# Patient Record
Sex: MALE | Race: BLACK OR AFRICAN AMERICAN | NOT HISPANIC OR LATINO | Employment: OTHER | URBAN - METROPOLITAN AREA
[De-identification: names, ages, dates, MRNs, and addresses within clinical notes are randomized per-mention and may not be internally consistent; named-entity substitution may affect disease eponyms.]

---

## 2017-07-06 ENCOUNTER — TELEPHONE (OUTPATIENT)
Dept: HEMATOLOGY/ONCOLOGY | Facility: CLINIC | Age: 52
End: 2017-07-06

## 2017-07-28 ENCOUNTER — TELEPHONE (OUTPATIENT)
Dept: HEMATOLOGY/ONCOLOGY | Facility: CLINIC | Age: 52
End: 2017-07-28

## 2017-07-28 ENCOUNTER — INITIAL CONSULT (OUTPATIENT)
Dept: HEMATOLOGY/ONCOLOGY | Facility: CLINIC | Age: 52
End: 2017-07-28
Payer: MEDICARE

## 2017-07-28 ENCOUNTER — LAB VISIT (OUTPATIENT)
Dept: LAB | Facility: HOSPITAL | Age: 52
End: 2017-07-28
Attending: INTERNAL MEDICINE
Payer: MEDICARE

## 2017-07-28 VITALS
WEIGHT: 162.25 LBS | HEART RATE: 85 BPM | DIASTOLIC BLOOD PRESSURE: 79 MMHG | BODY MASS INDEX: 24.59 KG/M2 | SYSTOLIC BLOOD PRESSURE: 148 MMHG | TEMPERATURE: 99 F | HEIGHT: 68 IN

## 2017-07-28 DIAGNOSIS — C79.51 BONE METASTASES: ICD-10-CM

## 2017-07-28 DIAGNOSIS — C64.9 RENAL CELL CARCINOMA, UNSPECIFIED LATERALITY: ICD-10-CM

## 2017-07-28 DIAGNOSIS — G89.3 CANCER RELATED PAIN: Primary | ICD-10-CM

## 2017-07-28 DIAGNOSIS — G89.3 CANCER RELATED PAIN: ICD-10-CM

## 2017-07-28 LAB
ALBUMIN SERPL BCP-MCNC: 2 G/DL
ALP SERPL-CCNC: 118 U/L
ALT SERPL W/O P-5'-P-CCNC: 15 U/L
ANION GAP SERPL CALC-SCNC: 18 MMOL/L
AST SERPL-CCNC: 27 U/L
BASOPHILS # BLD AUTO: 0.04 K/UL
BASOPHILS NFR BLD: 0.4 %
BILIRUB SERPL-MCNC: 0.5 MG/DL
BUN SERPL-MCNC: 55 MG/DL
CALCIUM SERPL-MCNC: 9.5 MG/DL
CHLORIDE SERPL-SCNC: 100 MMOL/L
CO2 SERPL-SCNC: 25 MMOL/L
CREAT SERPL-MCNC: 9 MG/DL
DIFFERENTIAL METHOD: ABNORMAL
EOSINOPHIL # BLD AUTO: 0.1 K/UL
EOSINOPHIL NFR BLD: 1.2 %
ERYTHROCYTE [DISTWIDTH] IN BLOOD BY AUTOMATED COUNT: 19.2 %
EST. GFR  (AFRICAN AMERICAN): 7.1 ML/MIN/1.73 M^2
EST. GFR  (NON AFRICAN AMERICAN): 6.1 ML/MIN/1.73 M^2
GLUCOSE SERPL-MCNC: 85 MG/DL
HCT VFR BLD AUTO: 22.5 %
HGB BLD-MCNC: 6.8 G/DL
LYMPHOCYTES # BLD AUTO: 1.8 K/UL
LYMPHOCYTES NFR BLD: 17.1 %
MAGNESIUM SERPL-MCNC: 1.6 MG/DL
MCH RBC QN AUTO: 25.2 PG
MCHC RBC AUTO-ENTMCNC: 30.2 G/DL
MCV RBC AUTO: 83 FL
MONOCYTES # BLD AUTO: 1.1 K/UL
MONOCYTES NFR BLD: 9.8 %
NEUTROPHILS # BLD AUTO: 7.6 K/UL
NEUTROPHILS NFR BLD: 71.5 %
PHOSPHATE SERPL-MCNC: 4.6 MG/DL
PLATELET # BLD AUTO: 250 K/UL
PMV BLD AUTO: 8.3 FL
POTASSIUM SERPL-SCNC: 3.6 MMOL/L
PROT SERPL-MCNC: 7.1 G/DL
RBC # BLD AUTO: 2.7 M/UL
SODIUM SERPL-SCNC: 143 MMOL/L
WBC # BLD AUTO: 10.68 K/UL

## 2017-07-28 PROCEDURE — 99999 PR PBB SHADOW E&M-EST. PATIENT-LVL IV: CPT | Mod: PBBFAC,,, | Performed by: INTERNAL MEDICINE

## 2017-07-28 PROCEDURE — 84100 ASSAY OF PHOSPHORUS: CPT

## 2017-07-28 PROCEDURE — 80053 COMPREHEN METABOLIC PANEL: CPT

## 2017-07-28 PROCEDURE — 99205 OFFICE O/P NEW HI 60 MIN: CPT | Mod: S$PBB,,, | Performed by: INTERNAL MEDICINE

## 2017-07-28 PROCEDURE — 36415 COLL VENOUS BLD VENIPUNCTURE: CPT

## 2017-07-28 PROCEDURE — 83735 ASSAY OF MAGNESIUM: CPT

## 2017-07-28 PROCEDURE — 85025 COMPLETE CBC W/AUTO DIFF WBC: CPT

## 2017-07-28 RX ORDER — OXYCODONE AND ACETAMINOPHEN 10; 325 MG/1; MG/1
TABLET ORAL
Refills: 0 | COMMUNITY
Start: 2017-05-31 | End: 2017-07-28 | Stop reason: SDUPTHER

## 2017-07-28 RX ORDER — MORPHINE SULFATE 30 MG/1
60 TABLET, FILM COATED, EXTENDED RELEASE ORAL 3 TIMES DAILY
Qty: 180 TABLET | Refills: 0 | Status: SHIPPED | OUTPATIENT
Start: 2017-07-28 | End: 2017-08-03 | Stop reason: SDUPTHER

## 2017-07-28 RX ORDER — AMLODIPINE BESYLATE 10 MG/1
TABLET ORAL
COMMUNITY
Start: 2017-07-27

## 2017-07-28 RX ORDER — METOPROLOL SUCCINATE 100 MG/1
TABLET, EXTENDED RELEASE ORAL
COMMUNITY
Start: 2017-07-20

## 2017-07-28 RX ORDER — HYDRALAZINE HYDROCHLORIDE 100 MG/1
TABLET, FILM COATED ORAL
Refills: 11 | COMMUNITY
Start: 2017-05-31

## 2017-07-28 RX ORDER — CLOPIDOGREL BISULFATE 75 MG/1
TABLET ORAL
COMMUNITY
Start: 2017-07-27

## 2017-07-28 RX ORDER — OXYCODONE AND ACETAMINOPHEN 10; 325 MG/1; MG/1
1 TABLET ORAL EVERY 4 HOURS PRN
Qty: 120 TABLET | Refills: 0 | Status: SHIPPED | OUTPATIENT
Start: 2017-07-28

## 2017-07-28 RX ORDER — ALPRAZOLAM 1 MG/1
TABLET ORAL
Refills: 4 | COMMUNITY
Start: 2017-07-11

## 2017-07-28 RX ORDER — MORPHINE SULFATE 30 MG/1
30 TABLET, FILM COATED, EXTENDED RELEASE ORAL 3 TIMES DAILY
COMMUNITY
End: 2017-07-28 | Stop reason: SDUPTHER

## 2017-07-28 RX ORDER — GUANFACINE 2 MG/1
TABLET ORAL
COMMUNITY
Start: 2017-07-20

## 2017-07-28 RX ORDER — OMEPRAZOLE 40 MG/1
CAPSULE, DELAYED RELEASE ORAL
COMMUNITY
Start: 2017-07-20

## 2017-07-28 RX ORDER — ATORVASTATIN CALCIUM 10 MG/1
TABLET, FILM COATED ORAL
COMMUNITY
Start: 2017-07-10

## 2017-07-28 NOTE — Clinical Note
CBC, CMP, Mg, Phos today, CT CAP and MRI brain ASAP on [facility] Onc referral ASAP, RTC to see me 1-2 wks after above completed.

## 2017-07-28 NOTE — TELEPHONE ENCOUNTER
tried called pt in regards to scan but received no answer,  states this not a working number at this time, pt was called on number ending [phone] pt is not available., pt appointments will be mailed out on Monday.

## 2017-07-28 NOTE — LETTER
July 28, 2017      Romeo Haq MD  [address] [facility] [address]           [facility] Hematology [facility]  [address]  [place] [address]  Phone: [phone]          Patient: Garo Hussein Sr   MR Number: [id number]   YOB: 1965   Date of Visit: 7/28/2017       Dear Dr. Romeo Haq:    Thank you for referring Garo Hussein Sr to me for evaluation. Attached you will find relevant portions of my assessment and plan of care.    If you have questions, please do not hesitate to call me. I look forward to following Garo Hussein Sr along with you.    Sincerely,    Rasheed Weston MD    Enclosure  CC:  No Recipients    If you would like to receive this communication electronically, please contact [email or web address] or [phone] to request more information on [employer] Link access.    For providers and/or their staff who would like to refer a patient to [facility], please contact us through our one-stop-shop provider referral line, Warren Roque, at [phone].    If you feel you have received this communication in error or would no longer like to receive these types of communications, please e-mail [email or web address]

## 2017-07-28 NOTE — PROGRESS NOTES
Subjective:       Patient ID: Garo Hussein Sr is a 52 y.o. male.    [facility]    [facility]PI     53yo M who recently visit Dr. Avery Haq at [facility] for new dx of metastatic RCC.  I do not have all of his records, but we will obtain a MARJORIE and try to get them.   Pt notes that he has not been feeling well.  He has tremendous pain in the L UE corresponding to the site of a bone met.  His PS has been declining, and is currently 2-3.   Here to discuss options.    Review of Systems   Constitutional: Positive for activity change, appetite change, fatigue and unexpected weight change. Negative for chills and fever.   HENT: Negative for congestion, hearing loss, mouth sores, sore throat and trouble swallowing.    Eyes: Negative for pain and visual disturbance.   Respiratory: Negative for cough, shortness of breath and wheezing.    Cardiovascular: Negative for chest pain, palpitations and leg swelling.   Gastrointestinal: Negative for abdominal pain, constipation, diarrhea, nausea and vomiting.   Endocrine: Negative for cold intolerance and heat intolerance.   Genitourinary: Negative for difficulty urinating, discharge, dysuria, enuresis, frequency, hematuria, scrotal swelling and testicular pain.   Musculoskeletal: Negative for arthralgias and myalgias.   Skin: Negative for color change, rash and wound.   Allergic/Immunologic: Negative for environmental allergies and food allergies.   Neurological: Negative for weakness, numbness and headaches.   Hematological: Negative for adenopathy. Does not bruise/bleed easily.   Psychiatric/Behavioral: Negative for confusion, hallucinations and sleep disturbance. The patient is not nervous/anxious.    All other systems reviewed and are negative.      Allergies:  Review of patient's allergies indicates:   Allergen Reactions    Soma [carisoprodol] Itching    Clonidine        Medications:  Current Outpatient Prescriptions   Medication Sig Dispense Refill    alprazolam  (XANAX) 1 MG tablet TK 1 T PO TID PRN  4    amlodipine (NORVASC) 10 MG tablet       atorvastatin (LIPITOR) 10 MG tablet       clopidogrel (PLAVIX) 75 mg tablet       guanfacine (TENEX) 2 MG tablet       hydrALAZINE (APRESOLINE) 100 MG tablet TK 1 T PO  TID  11    metoprolol succinate (TOPROL-XL) 100 MG 24 hr tablet       morphine (MS CONTIN) 30 MG 12 hr tablet Take 2 tablets (60 mg total) by mouth 3 (three) times daily. 180 tablet 0    omeprazole (PRILOSEC) 40 MG capsule       oxycodone-acetaminophen (PERCOCET)  mg per tablet Take 1 tablet by mouth every 4 (four) hours as needed for Pain. 120 tablet 0     No current facility-administered medications for this visit.        PMH:  Past Medical History:   Diagnosis Date    Cancer     Disorder of kidney and ureter     Renal cancer        PSH:  Past Surgical History:   Procedure Laterality Date    bone cancer Left     shoulder       FamHx:  History reviewed. No pertinent family history.    SocHx:  Social History     Social History    Marital status:      Spouse name: N/A    Number of children: N/A    Years of education: N/A     Occupational History    Not on file.     Social History Main Topics    Smoking status: Never Smoker    Smokeless tobacco: Never Used    Alcohol use No    Drug use:      Frequency: 21.0 times per week     Types: Marijuana      Comment: Patient smoke 3 times a day    Sexual activity: No     Other Topics Concern    Not on file     Social History Narrative    No narrative on file       Distress Score    Distress Score: 9        Practical Problems Physical Problems   : No Appearance: No   Housing: No Bathing / Dressing: No   Insurance / Financial: No Breathing: No    Transportation: No  Changes in Urination: No    Work / School: No  Constipation: No   Treatment Decisions: Yes  Diarrhea: No     Eating: Yes    Family Problems Fatigue: Yes    Dealing with Children: No Feeling Swollen: No    Dealing with  Partner: No Fevers: No    Ability to Have Children: No  Getting Around: Yes    Family Health Issues: Yes  Indigestion: No     Memory / Concentration: Yes   Emotional Problems Mouth Sores: No    Depression: Yes  Nausea: No    Fears: Yes  Nose Dry / Congested: No    Nervousness: Yes  Pain: Yes    Sadness: Yes Sexual: No    Worry: Yes Skin Dry / Itchy: No    Lost of Interest in Usual Activities: Yes Sleep: No     Substance Abuse: Yes    Spiritual/Religions Concerns Tingling in Hands / Feet: No             Other Problems              Objective:      Physical Exam   Constitutional: He is oriented to person, place, and time. He appears well-nourished. No distress.   In wheelchair, appears chronically ill   HENT:   Head: Normocephalic and atraumatic.   Mouth/Throat: Oropharynx is clear and moist. No oropharyngeal exudate.   Eyes: Conjunctivae and EOM are normal. Pupils are equal, round, and reactive to light. Right eye exhibits no discharge. Left eye exhibits no discharge. No scleral icterus.   Neck: Normal range of motion. Neck supple. No tracheal deviation present.   Cardiovascular: Normal rate, regular rhythm, normal heart sounds and intact distal pulses.  Exam reveals no gallop and no friction rub.    No murmur heard.  Pulmonary/Chest: Effort normal and breath sounds normal. No respiratory distress. He has no wheezes. He has no rales.   Abdominal: Soft. Bowel sounds are normal. He exhibits no distension. There is no tenderness. There is no rebound and no guarding.   Musculoskeletal: Normal range of motion. He exhibits no edema or tenderness.   Neurological: He is alert and oriented to person, place, and time.   Skin: Skin is warm and dry. No rash noted. He is not diaphoretic. No erythema.   Psychiatric: He has a normal mood and affect. His behavior is normal. Judgment and thought content normal.   Nursing note and vitals reviewed.      Assessment:       1. Cancer related pain    2. Renal cell carcinoma, unspecified  laterality    3. Bone metastases        Plan:       1. Met RCC:  - Performance status is borderline at best  - Discussed starting home health, restaging scans including MRI brain, referral to rad onc for consideration of palliative radiation to L UE bone met (although this is over HD fistula).  It his PS improves we can consider dose reduced pazo vs nivo.    - Pt understands this is an incurable disease and that his life expectancy is likely limited in the setting of terminal cancer with such severe comorbidities.   - Refilled current pain regimen    CBC, CMP, Mg, Phos today, CT CAP and MRI brain ASAP on [facility], Rad Onc referral ASAP, RTC to see me 1-2 wks after above completed.    The patient agrees with the plan, and all questions have been answered to their satisfaction.      More than 60 mins were spent during this encounter, greater than 50% was spent in direct counseling and/or coordination of care.     Rasheed Weston M.D., M.S., F.A.C.P.  Hematology and Oncology Attending  [facility]  [facility]      Distress Screening Results: Psychosocial Distress screening score of Distress Score: 9 noted and reviewed. No intervention indicated.

## 2017-07-29 NOTE — TELEPHONE ENCOUNTER
----- Message from Qi Cook LCSW sent at 7/28/2017  7:23 PM CDT -----  Spoke with patient's wife. Referral to [facility].   ----- Message -----  From: Veronica Charlton RN  Sent: 7/28/2017  11:52 AM  To: Qi Cook LCSW        ----- Message -----  From: Rasheed Weston MD  Sent: 7/28/2017  11:50 AM  To: Veronica Charlton RN, KARINA Sims, Needs home health ASAP. Orders in.  Thanks!  -MM

## 2017-08-03 DIAGNOSIS — G89.3 CANCER RELATED PAIN: ICD-10-CM

## 2017-08-03 RX ORDER — MORPHINE SULFATE 30 MG/1
60 TABLET, FILM COATED, EXTENDED RELEASE ORAL 3 TIMES DAILY
Qty: 180 TABLET | Refills: 0 | Status: SHIPPED | OUTPATIENT
Start: 2017-08-03 | End: 2017-08-04

## 2017-08-03 NOTE — TELEPHONE ENCOUNTER
----- Message from Torie Knutson sent at 8/3/2017  8:14 AM CDT -----  Contact: Pt's wife  Pt's wife is calling to have medication refilled for morphine (MS CONTIN) 30 MG 12 hr tablet.  Pt is currently out of medication and more than 2 calls have been placed to get a refill sent to pharmacy.    Rx can be sent to [facility] at [phone].  Pt's wife can be reached at [phone].    Thank you

## 2017-08-04 ENCOUNTER — TELEPHONE (OUTPATIENT)
Dept: HEMATOLOGY/ONCOLOGY | Facility: CLINIC | Age: 52
End: 2017-08-04

## 2017-08-04 ENCOUNTER — HOSPITAL ENCOUNTER (OUTPATIENT)
Dept: RADIOLOGY | Facility: HOSPITAL | Age: 52
Discharge: HOME | End: 2017-08-04
Attending: INTERNAL MEDICINE
Payer: MEDICARE

## 2017-08-04 DIAGNOSIS — G89.3 CANCER RELATED PAIN: ICD-10-CM

## 2017-08-04 DIAGNOSIS — C79.51 BONE METASTASES: ICD-10-CM

## 2017-08-04 DIAGNOSIS — C64.9 RENAL CELL CARCINOMA, UNSPECIFIED LATERALITY: ICD-10-CM

## 2017-08-04 PROCEDURE — 71250 CT THORAX DX C-: CPT | Mod: TC

## 2017-08-04 PROCEDURE — 70551 MRI BRAIN STEM W/O DYE: CPT | Mod: 26,,, | Performed by: RADIOLOGY

## 2017-08-04 PROCEDURE — 71250 CT THORAX DX C-: CPT | Mod: 26,,, | Performed by: RADIOLOGY

## 2017-08-04 PROCEDURE — 74176 CT ABD & PELVIS W/O CONTRAST: CPT | Mod: TC

## 2017-08-04 PROCEDURE — 70551 MRI BRAIN STEM W/O DYE: CPT | Mod: TC

## 2017-08-04 PROCEDURE — 74176 CT ABD & PELVIS W/O CONTRAST: CPT | Mod: 26,,, | Performed by: RADIOLOGY

## 2017-08-04 PROCEDURE — 25500020 PHARM REV CODE 255

## 2017-08-04 RX ADMIN — IOHEXOL 30 ML: 350 INJECTION, SOLUTION INTRAVENOUS at 09:08

## 2017-08-04 NOTE — TELEPHONE ENCOUNTER
Called pt wife---wife unavailable.   Left  informing wife of new morphine rx called in (lower quantity) and if any issues have pharmacy call Dr. Weston.   Informed wife on VM that this morphine not to be split and will be 1 tab TID prn pain.   Provided callback number if any questions.

## 2017-08-04 NOTE — TELEPHONE ENCOUNTER
1. Called [employer] and was informed that reasoning that pharmacist didn't call provider was because medication was rejected so pharmacist never saw medication note.     2. Called [facility], and spoke with representative Cyndy, and asked reasoning why medication not able to be processed.   Cyndy stated that PA needed on medication.   PA completed for Morphine 60mg, 90 tablets.   PA approved, case number #[id number], effective 7/5/17-8/4/18.   Cyndy asked that they will notify provider and patient of approval, just to call pharmacy and notify them of approval.   Thanked Cyndy for her help.     3. Called [employer] and informed them of approval for Morphine 60mg. [facility] stated medication able to be filled now, and they will contact pt wife of medication fill.     Message routed to Dr. Weston ([facility]).           ----- Message from Rasheed Weston MD sent at 8/4/2017  2:47 PM CDT -----  Contact: pt   Can we call [employer] and 1) find out why the pharmacist didn't call me as instructed, and 2) what the insurance company will pay for.  Unfortunately, the wife may have to come to [facility] if she wants to get the medicine, [facility] just doesn't work for cancer patients most of the time.  THANK YOU!!   -MM    ----- Message -----  From: Dulce Pond  Sent: 8/4/2017   2:04 PM  To: Rasheed Weston MD    No! Pt wife went to [facility] to  new Morphine rx and they denied her again, so she called insurance company and they stated you would have to call [phone] as they were not going to approve.   Pt wife stated that unfortunately she will not be back around [facility] until 8/18.     ----- Message -----  From: Rasheed Weston MD  Sent: 8/4/2017   1:59 PM  To: Dulce Pond    Did the pharmacist call you?  I asked them to call if there was a problem with the script.     ----- Message -----  From: Dulce Pond  Sent: 8/4/2017   1:34 PM  To: Rasheed Weston MD    Would you like to attempt to send rx into  [facility]?   [employer] and insurance still giving pt trouble with new Morphine rx.   Thanks!    ----- Message -----  From: Allan Diane  Sent: 8/4/2017   1:17 PM  To: Trista Iqbal Staff    Pt would like to be called back regarding morphine 60 mg Tr12. Pt insurance company is denying medication. Pt has been out of medication for 5 days and would like medication as soon as possible.     [facility] [address] [place] [address] & [facility]    Pt can be reached at [phone].

## 2017-08-04 NOTE — TELEPHONE ENCOUNTER
Returned call to pt wife.   Wife stated that insurance will not approve 180 tablets of Morphine to be dispensed.   Wife stated that [facility] stated to write a new rx for a lower quantity of tablets.   Wife stated she personally called insurance and they informed her 180 tablets will not be approved.   Informed wife I would fwd message to Dr. Weston to help assist, as pt wife stated pt has been out of medication since Weds.   Wife verbalized understanding.       ----- Message from Mony Villegas sent at 8/4/2017  8:14 AM CDT -----  Contact: Ms Jovita/   [phone]  Patient need refill on medication Morphine called into [facility] pharmacy..   Ms Hussein  states  was advised that insurance will not approved 180mg table   Pt need lower dosage 60mg tablet or lower quantity.   Patient out of medication     Please call Ms Hussein [phone]

## 2017-08-08 ENCOUNTER — INITIAL CONSULT (OUTPATIENT)
Dept: RADIATION ONCOLOGY | Facility: CLINIC | Age: 52
End: 2017-08-08
Payer: MEDICARE

## 2017-08-08 VITALS
BODY MASS INDEX: 24.16 KG/M2 | HEART RATE: 83 BPM | TEMPERATURE: 99 F | WEIGHT: 158.88 LBS | DIASTOLIC BLOOD PRESSURE: 71 MMHG | RESPIRATION RATE: 20 BRPM | SYSTOLIC BLOOD PRESSURE: 137 MMHG

## 2017-08-08 DIAGNOSIS — C64.2 PRIMARY MALIGNANT NEOPLASM OF LEFT KIDNEY WITH METASTASIS FROM KIDNEY TO OTHER SITE: ICD-10-CM

## 2017-08-08 DIAGNOSIS — N18.5 CHRONIC RENAL FAILURE, STAGE 5: ICD-10-CM

## 2017-08-08 DIAGNOSIS — C79.51 METASTASIS TO BONE: ICD-10-CM

## 2017-08-08 PROCEDURE — 99204 OFFICE O/P NEW MOD 45 MIN: CPT | Mod: S$PBB,,, | Performed by: RADIOLOGY

## 2017-08-08 PROCEDURE — 99213 OFFICE O/P EST LOW 20 MIN: CPT | Mod: PBBFAC | Performed by: RADIOLOGY

## 2017-08-08 PROCEDURE — 99999 PR PBB SHADOW E&M-EST. PATIENT-LVL III: CPT | Mod: PBBFAC,,, | Performed by: RADIOLOGY

## 2017-08-08 NOTE — LETTER
August 8, 2017      Rasheed Weston MD  [address] [facility] [address]           [facility]  [address]  [place] [address]  Phone: [phone]          Patient: Garo Hussein   MR Number: [id number]   YOB: 1965   Date of Visit: 8/8/2017       Dear Dr. Rasheed Weston:    Thank you for referring Garo Hussein to me for evaluation. Attached you will find relevant portions of my assessment and plan of care.    If you have questions, please do not hesitate to call me. I look forward to following Garo Hussein along with you.    Sincerely,    Mohamud Catherine MD    Enclosure  CC:  No Recipients    If you would like to receive this communication electronically, please contact [email or web address] or [phone] to request more information on [employer] access.    For providers and/or their staff who would like to refer a patient to [facility], please contact us through our one-stop-shop provider referral line, Warren Roque, at [phone].    If you feel you have received this communication in error or would no longer like to receive these types of communications, please e-mail [email or web address]

## 2017-08-08 NOTE — PROGRESS NOTES
REFERRING PHYSICIAN: Rasheed Weston MD    PROBLEM: Mr Hussein is a 52 years old man recently found to have a stage T1b N0 M1 renal cell carcinoma of the left kidney with painful metastasis to the left proximal humerus and possibly other skeletal sites.      OTHER MEDICAL HISTORY: Patient has never smoked. He is treated or followed for renal failure and is on hemodialysis. PS is ECOG 2. Psychosocial Distress screening score of Distress Score: 6 noted and reviewed.     PRESENT ILLNESS: Patient apparently developed severe left shoulder pain and was thought to have a cancer. He sought treatment at [facility]. Imaging studies, apparently from [facility] include CT of the chest, abdomen and pelvis and bone scan on 6/5/17 and 6/7/17 show a 4.3 cm rim calcified mass in the upper pole of the left kidney, an extensively destructive metastasis involving the head, neck and proximal shaft of he left humerus and several lymph nodes in the left axilla that are enlarged to 1 to 2 cm size. The bone scan suggests there may be a small lesion in a left lateral axillary rib and in the left ischial tuberosity. There is a CT for percutaneous needle biopsy of the left kidney mass dated 6/13/17.    He was referred to see Dr Weston in oncology clinic on 7/28/17. There is presently debilitating pain in the left shoulder that restricts motion of the shoulder.     PHYSICAL EXAM: Patient has a sleepy affect but listens and responds appropriately. He holds the left upper extremity motionless and splinted at the side. The left hand is functional. The respirations are normal. There are no evident neurologic deficits.     RADIOLOGIC STUDIES: In addition to that noted above, MRI of the brain on 8/4/17 shows no significant abnormality.     LABORATORY STUDIES: On 7/28/17 the Hb is 6.8 with a wbc of 10,680 and a platelet count of 250,000. Comprehensive metabolic panel is remarkable for Creatinine of 9.0, BUN of 55 albumin of 2.0.      IMPRESSION: Treatment of the left humeral site is recommended to relieve pain.     PLAN: Patient lives in [facility] and would like treatment there. Hernan Angulo and Brittny at the radiation treatment facility at [facility] ([phone]) have kindly agreed to see him regarding treatment. (45 minutes at least half of which was in discussion with patient and friend and coordinating care).

## 2017-08-11 ENCOUNTER — PATIENT MESSAGE (OUTPATIENT)
Dept: HEMATOLOGY/ONCOLOGY | Facility: CLINIC | Age: 52
End: 2017-08-11

## 2017-08-11 ENCOUNTER — INITIAL CONSULT (OUTPATIENT)
Dept: RADIATION ONCOLOGY | Facility: CLINIC | Age: 52
End: 2017-08-11
Payer: MEDICARE

## 2017-08-11 VITALS
RESPIRATION RATE: 22 BRPM | HEIGHT: 68 IN | TEMPERATURE: 99 F | SYSTOLIC BLOOD PRESSURE: 131 MMHG | WEIGHT: 160 LBS | HEART RATE: 86 BPM | DIASTOLIC BLOOD PRESSURE: 76 MMHG | BODY MASS INDEX: 24.25 KG/M2

## 2017-08-11 DIAGNOSIS — G47.00 INSOMNIA, UNSPECIFIED TYPE: Primary | ICD-10-CM

## 2017-08-11 DIAGNOSIS — C64.2 PRIMARY MALIGNANT NEOPLASM OF LEFT KIDNEY WITH METASTASIS FROM KIDNEY TO OTHER SITE: Primary | ICD-10-CM

## 2017-08-11 PROCEDURE — 3075F SYST BP GE 130 - 139MM HG: CPT | Mod: ,,, | Performed by: RADIOLOGY

## 2017-08-11 PROCEDURE — 3008F BODY MASS INDEX DOCD: CPT | Mod: ,,, | Performed by: RADIOLOGY

## 2017-08-11 PROCEDURE — 99205 OFFICE O/P NEW HI 60 MIN: CPT | Mod: ,,, | Performed by: RADIOLOGY

## 2017-08-11 PROCEDURE — 3078F DIAST BP <80 MM HG: CPT | Mod: ,,, | Performed by: RADIOLOGY

## 2017-08-11 RX ORDER — TRAZODONE HYDROCHLORIDE 50 MG/1
50 TABLET ORAL NIGHTLY
Qty: 30 TABLET | Refills: 11 | Status: SHIPPED | OUTPATIENT
Start: 2017-08-11 | End: 2018-08-11

## 2017-08-11 NOTE — PROGRESS NOTES
Garo Hussein Sr.  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 Ashraf: ECOG 2; no CTX presently; refer to RadOnc  *7/17 CT C  Moderate pericardial effusion.  Mild cardiomegaly.  3 mm nodule in the right upper lobe and 8 mm nodule in the left lingula may represent granulomas but metastasis is not ruled out.  *7/17 CT AP  4.8 cm calcific rimmed mass of the superior pole of the left kidney.  Small calcification in the lower pole of the left kidney and 2 small calcifications of the right kidney without hydronephrosis.  Anasarca.  On this study osteolytic bone lesions are not identified but comparison with prior studies if the films become available is recommended.  *7/17 MRI B: randee  *8/17 Dr. Catherine: rec palliative RT to L shoulder    Pt has never received anti-neoplastic therapy. He remains on HD 3d/week. He is reporting 10/10 exquisite pain at L shoulder, no ROM at the shoulder. He is requiring significant pain Rx and is always sedated per POA.    Review of Systems   Constitutional: Positive for unexpected weight change. Negative for appetite change, chills and fever.   HENT:   Negative for lump/mass, mouth sores, sore throat, trouble swallowing and voice change.    Eyes: Negative for eye problems and  "icterus.   Respiratory: Negative for chest tightness, cough, hemoptysis and shortness of breath.    Cardiovascular: Negative for chest pain and leg swelling.   Gastrointestinal: Negative for abdominal distention, abdominal pain, blood in stool, constipation, diarrhea, nausea and vomiting.   Genitourinary: Positive for difficulty urinating. Negative for bladder incontinence, dysuria, frequency, hematuria and nocturia.    Musculoskeletal: Positive for arthralgias and myalgias. Negative for back pain, gait problem, neck pain and neck stiffness.   Neurological: Negative for extremity weakness, gait problem, headaches, numbness and seizures.   Hematological: Negative for adenopathy.   Psychiatric/Behavioral: Positive for confusion, decreased concentration and sleep disturbance.     Past Medical History:   Diagnosis Date    Anemia of renal disease     Cancer     CHF (congestive heart failure)     CKD (chronic kidney disease) stage 5, GFR less than 15 ml/min     Disorder of kidney and ureter     Gout     Hyperparathyroidism, secondary renal     Hypertension     Hypertension, renal     Renal cancer     Renal disorder      Past Surgical History:   Procedure Laterality Date    AV FISTULA PLACEMENT Left 2010    bone cancer Left     shoulder    KNEE SURGERY Left     "blown knee cap"     Social History     Social History    Marital status:      Spouse name: N/A    Number of children: N/A    Years of education: N/A     Social History Main Topics    Smoking status: Never Smoker    Smokeless tobacco: Never Used    Alcohol use No    Drug use:      Frequency: 21.0 times per week     Types: Marijuana      Comment: Patient smoke 3 times a day    Sexual activity: No     Other Topics Concern    None     Social History Narrative    ** Merged History Encounter **         ** Merged History Encounter **          Family History   Problem Relation Age of Onset    Hypertension Mother     Hypertension Father     " Hypertension Maternal Grandmother     Hypertension Maternal Grandfather     Hypertension Paternal Grandmother     Hypertension Paternal Grandfather     Alzheimer's disease Maternal Grandmother      diagnosed in early 60s    Alzheimer's disease Paternal Grandmother      diagnosed in early 50s    Stroke Mother     Cancer Father      gastric?    Coronary artery disease Father      7 MIs; first one in late 30s or early 40s    Cerebral aneurysm Father     Kidney disease Paternal Uncle      was on dialysis    Kidney disease Maternal Grandmother      not on dialysis yet but with AVF in place    Kidney disease Paternal Aunt      was on dialysis    Diabetes Maternal Grandmother        PRIOR HISTORY OF CHEMOTHERAPY OR RADIOTHERAPY: Please see HPI for patients prior oncologic history.    Medication List with Changes/Refills   Current Medications    ALLOPURINOL (ZYLOPRIM) 100 MG TABLET    Take 200 mg by mouth once daily.    ALLOPURINOL (ZYLOPRIM) 100 MG TABLET    Take 100 mg by mouth 2 (two) times daily.    ALPRAZOLAM (XANAX) 1 MG TABLET    TK 1 T PO TID PRN    AMLODIPINE (NORVASC) 10 MG TABLET    Take 10 mg by mouth 2 (two) times daily.    AMLODIPINE (NORVASC) 10 MG TABLET    Take 10 mg by mouth once daily.    AMLODIPINE (NORVASC) 10 MG TABLET        ATORVASTATIN (LIPITOR) 10 MG TABLET        BENZTROPINE (COGENTIN) 1 MG TABLET    Take 1 mg by mouth 2 (two) times daily.    CALCITRIOL (ROCALTROL) 0.5 MCG CAP    Take 0.5 mcg by mouth 2 (two) times daily.    CALCIUM ACETATE (PHOSLO) 667 MG CAPSULE    Take 667 mg by mouth 3 (three) times daily with meals.    CALCIUM ACETATE (PHOSLO) 667 MG CAPSULE    Take 2,001 mg by mouth 3 (three) times daily with meals.    CLONAZEPAM (KLONOPIN) 1 MG TABLET    Take 1 mg by mouth 2 (two) times daily as needed for Anxiety.    CLOPIDOGREL (PLAVIX) 75 MG TABLET        FEXOFENADINE (ALLEGRA) 60 MG TABLET    Take 1 tablet (60 mg total) by mouth 2 (two) times daily.    FLUTICASONE  (FLONASE) 50 MCG/ACTUATION NASAL SPRAY    1 spray by Each Nare route 2 (two) times daily as needed for Rhinitis.    FLUTICASONE (FLONASE) 50 MCG/ACTUATION NASAL SPRAY    1 spray by Each Nare route once daily.    FUROSEMIDE (LASIX) 20 MG TABLET    Take 20 mg by mouth once daily.    FUROSEMIDE (LASIX) 40 MG TABLET    Take 40 mg by mouth once daily.    GUANFACINE (TENEX) 2 MG TABLET    Take 2 mg by mouth nightly.    GUANFACINE (TENEX) 2 MG TABLET        HYDRALAZINE (APRESOLINE) 100 MG TABLET    TK 1 T PO  TID    HYDROCODONE-ACETAMINOPHEN 10-325MG (NORCO)  MG TAB    Take 1 tablet by mouth 2 (two) times daily as needed (pain).    LISINOPRIL (PRINIVIL,ZESTRIL) 40 MG TABLET    Take 40 mg by mouth once daily.    METOPROLOL SUCCINATE (TOPROL-XL) 100 MG 24 HR TABLET    Take 100 mg by mouth 2 (two) times daily.    METOPROLOL SUCCINATE (TOPROL-XL) 100 MG 24 HR TABLET    Take 100 mg by mouth 2 (two) times daily.    METOPROLOL SUCCINATE (TOPROL-XL) 100 MG 24 HR TABLET        MORPHINE 60 MG TR12    Take 60 mg by mouth 3 (three) times daily.    OMEPRAZOLE (PRILOSEC) 40 MG CAPSULE    Take 40 mg by mouth once daily.    OMEPRAZOLE (PRILOSEC) 40 MG CAPSULE    Take 40 mg by mouth every evening.    OMEPRAZOLE (PRILOSEC) 40 MG CAPSULE        OXYCODONE-ACETAMINOPHEN (PERCOCET)  MG PER TABLET    Take 1 tablet by mouth every 4 (four) hours as needed for Pain.    PARICALCITOL (ZEMPLAR) 1 MCG CAPSULE    Take 1 mcg by mouth once daily.    PRAVASTATIN (PRAVACHOL) 40 MG TABLET    Take 40 mg by mouth once daily.    QUETIAPINE (SEROQUEL) 200 MG TAB    Take 200 mg by mouth once daily.    SEVELAMER CARBONATE (RENVELA) 800 MG TAB    Take 800 mg by mouth 3 (three) times daily with meals.    SODIUM POLYSTYRENE (KAYEXALATE) 15 GRAM/60 ML SUSP    Take 15 g by mouth 3 (three) times a week.     Review of patient's allergies indicates:   Allergen Reactions    Soma [carisoprodol] Itching    Clonidine Hives    Clonidine Hives    Clonidine      "Soma [carisoprodol] Other (See Comments)     Gastric ulcers    Soma [carisoprodol] Other (See Comments)     Stomach ulcers       QUALITY OF LIFE: 50%- Requires Considerable Assistance and Frequent Medical Care    Vitals:    08/11/17 0823   BP: 131/76   Pulse: 86   Resp: (!) 22   Temp: 99 °F (37.2 °C)   TempSrc: Oral   Weight: 72.6 kg (160 lb)   Height: 5' 8" (1.727 m)   PainSc:   8   PainLoc: Arm       PHYSICAL EXAM:   GENERAL: sedated; nodding off; ao x 2  HEAD: normocephalic, atraumatic.  EYES: pupils are equal, round, reactive to light and accommodation. [place] sclera. Conjunctiva not injected.   NOSE/THROAT: no nasal erythema or rhinorrhea. Oropharynx pink, without erythema, ulcerations or thrush.   NECK: no cervical motion rigidity; supple with no masses.  CHEST: Patient is speaking comfortably on room air with normal work of breathing without using accessory muscles of respiration.  MUSCULOSKELETAL: exquisite sensitivity at L shoulder with guarding; no ROM  NEUROLOGIC: cranial nerves II-XII intact bilaterally. Strength 5/5 in bilateral upper and lower extremities excluding LUE. Unsteady gait.   SKIN: no erythema, rashes, ulcerations noted.     REVIEW OF IMAGING/PATHOLOGY/LABS: Please see HPI. All images reviewed personally by dictating physician.     ASSESSMENT: 52M KPS <70 and CKD V on dialysis with stage IV, [id number] RCC L kidney with painful L humerus metastasis.  PLAN:   On my assessment today Mr. Hussein has a KPS less than 70, graded him today at 50 and what appears to be a diagnosis of stage IV renal cell carcinoma originating from the left kidney. He has not yet received any anti-neoplastic therapy and his workup included being seen [facility] where biopsy of the left kidney returned a diagnosis of renal cell carcinoma. 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 In the " setting of PET avid retroperitoneal lymphadenopathy and known renal cell carcinoma of the L kidney I think the shoulder site is consistent with metastatic renal cell carcinoma. Patient has met with medical oncology who recommended he receive palliative radiotherapy and then be reconsidered for systemic therapy, noting his poor performance status.    I explained in detail to the patient as well as his support who is his power of  that our goal of therapy would be to palliate the significant pain he is having in the left shoulder. Given his poor performance status and competing comorbid conditions I'm recommending a dose of 2000 cGy in 5 fractions to be done with 3-D conformal techniques. Following completion of radiotherapy the patient will return to medical oncology to be considered for systemic therapy. The patient as well as his support expressed understanding would like to proceed with therapy.    We discussed the risks and benefits of the above treatment and have gone over in detail the acute and late toxicities of radiation therapy to the L shoulder. The patient expressed  understanding and has signed a consent form which is included in the patients chart. The patient has our contact information and understands that they are free to contact us at any time with questions or concerns regarding radiation therapy.    DISPOSITION: RTC FOR CT SIM    TIME SPENT WITH PATIENT: I have personally seen and evaluated this patient. Approximately one hour was spent in consultation with the patient, greater than 50% of this time was spent discussing the personalized oncologic treatment plan and details of radiation therapy with the patient.     PHYSICIAN: Nabor Donaldson Jr, MD

## 2017-08-15 ENCOUNTER — PATIENT MESSAGE (OUTPATIENT)
Dept: HEMATOLOGY/ONCOLOGY | Facility: CLINIC | Age: 52
End: 2017-08-15

## 2017-08-15 ENCOUNTER — TELEPHONE (OUTPATIENT)
Dept: HEMATOLOGY/ONCOLOGY | Facility: CLINIC | Age: 52
End: 2017-08-15

## 2017-08-15 ENCOUNTER — DOCUMENTATION ONLY (OUTPATIENT)
Dept: RADIATION ONCOLOGY | Facility: CLINIC | Age: 52
End: 2017-08-15

## 2017-08-15 DIAGNOSIS — G47.00 INSOMNIA, UNSPECIFIED TYPE: Primary | ICD-10-CM

## 2017-08-15 RX ORDER — ZOLPIDEM TARTRATE 12.5 MG/1
12.5 TABLET, FILM COATED, EXTENDED RELEASE ORAL NIGHTLY PRN
Qty: 30 TABLET | Refills: 0 | Status: SHIPPED | OUTPATIENT
Start: 2017-08-15 | End: 2018-08-15

## 2017-08-15 NOTE — PROGRESS NOTES
Garo Hussein .  [id number]  1965  8/15/2017  No referring provider defined for this encounter.    DIAGNOSIS: Primary malignant neoplasm of left kidney with metastasis from kidney to other site    Staging form: Kidney, AJCC 7th Edition    - Clinical: Stage IV (T1b, N1, M1) - Signed by Nabor Donaldson Jr., MD on 8/11/2017    TREATMENT SITE(S): Left humerus    INTENT: PALLIATIVE    TREATMENT SETTING: RT ALONE     MODALITY: PHOTON    TECHNIQUE:  3D CONFORMAL RADIOTHERAPY (3DCRT)    IMRT MEDICAL NECESSITY: N/A    I have personally performed treatment planning for the patient, reviewing relevant history/physical and imaging. I have defined GTV, CTV, PTV and organs at risk.     In order to accomplish this plan, I am ordering:  SIMULATION: CT SIMULATION FOR PLACEMENT OF TREATMENT FIELDS    CONTRAST: none    TO ACCOMPLISH REPRODUCIBLE POSITION: VACLOC    DEVICES FOR BEAM SHAPING: CUSTOMIZED MLC    CUSTOMIZED BOLUS: none    IMAGING: DAILY KV/KV OBI    I have ordered a weekly physics check.    SPECIAL PHYSICS CONSULT: NO  REASON: N/A    SPECIAL TREATMENT CIRCUMSTANCE: NO  Concurrent or recent administration of chemotherapeutic agents which are known potent radiosensitizers and thus will require vigilant monitoring for  exaggerated radiation toxicities.    LABS: NONE    ANTICIPATED PRESCRIPTION: 2000cGy/5frx of 400cGy using 6/23X to isocenter    TREATMENT: DAILY    PHYSICIAN: Nabor Donaldson Jr, MD

## 2017-08-15 NOTE — TELEPHONE ENCOUNTER
----- Message from Collette Yang sent at 8/15/2017 12:20 PM CDT -----  Contact: Senait with [facility]  Senait needs to speak with the nurse as the patient was put on a new medication Trazodone & since then the patient has declined & not sleeping well.  Please call Senait to advise at [phone]

## 2017-08-15 NOTE — TELEPHONE ENCOUNTER
LVM for Senait the home health nurse regarding sleeping medicine not helping. I advised through msg that patient sent msg and was forward to Kalpana Le NP to respond with another option    ---Moraima Ceja

## 2017-08-16 ENCOUNTER — TELEPHONE (OUTPATIENT)
Dept: HEMATOLOGY/ONCOLOGY | Facility: CLINIC | Age: 52
End: 2017-08-16

## 2017-08-16 ENCOUNTER — PATIENT MESSAGE (OUTPATIENT)
Dept: HEMATOLOGY/ONCOLOGY | Facility: CLINIC | Age: 52
End: 2017-08-16

## 2017-08-16 DIAGNOSIS — C79.51 METASTASIS TO BONE: Primary | ICD-10-CM

## 2017-08-16 DIAGNOSIS — C64.2 PRIMARY MALIGNANT NEOPLASM OF LEFT KIDNEY WITH METASTASIS FROM KIDNEY TO OTHER SITE: ICD-10-CM

## 2017-08-16 NOTE — TELEPHONE ENCOUNTER
Wife verified that pain medication is correct, will let her know if any changes are made while waiting for hospice to be set in place

## 2017-08-16 NOTE — TELEPHONE ENCOUNTER
----- Message from Cristopher Davidson RN sent at 8/16/2017 10:26 AM CDT -----  [facility]      ----- Message -----  From: Bong Zamora  Sent: 8/16/2017  10:18 AM  To: Trista Iqbal Staff    The PT and family members will hospice and needs an order     Torie Contact: [phone]  Fax# [phone]

## 2017-08-18 ENCOUNTER — DOCUMENTATION ONLY (OUTPATIENT)
Dept: RADIATION ONCOLOGY | Facility: CLINIC | Age: 52
End: 2017-08-18

## 2017-08-18 NOTE — PROGRESS NOTES
Mr. Hussein and his wife have elected to proceed with hospice and not undergo palliative RT to his L humerus.     At consult, he was appreciated to have declining KPS <70. He was planned for 20Gy/5frx.    We will close out his RT chart.